# Patient Record
Sex: FEMALE | Race: WHITE | HISPANIC OR LATINO
[De-identification: names, ages, dates, MRNs, and addresses within clinical notes are randomized per-mention and may not be internally consistent; named-entity substitution may affect disease eponyms.]

---

## 2017-11-21 ENCOUNTER — FORM ENCOUNTER (OUTPATIENT)
Age: 62
End: 2017-11-21

## 2017-11-21 ENCOUNTER — APPOINTMENT (OUTPATIENT)
Dept: OBGYN | Facility: CLINIC | Age: 62
End: 2017-11-21
Payer: COMMERCIAL

## 2017-11-21 ENCOUNTER — APPOINTMENT (OUTPATIENT)
Dept: INTERNAL MEDICINE | Facility: CLINIC | Age: 62
End: 2017-11-21
Payer: COMMERCIAL

## 2017-11-21 ENCOUNTER — NON-APPOINTMENT (OUTPATIENT)
Age: 62
End: 2017-11-21

## 2017-11-21 VITALS
BODY MASS INDEX: 19.69 KG/M2 | DIASTOLIC BLOOD PRESSURE: 77 MMHG | SYSTOLIC BLOOD PRESSURE: 113 MMHG | HEIGHT: 62 IN | HEART RATE: 66 BPM | WEIGHT: 107 LBS

## 2017-11-21 DIAGNOSIS — Z81.8 FAMILY HISTORY OF OTHER MENTAL AND BEHAVIORAL DISORDERS: ICD-10-CM

## 2017-11-21 PROCEDURE — 99396 PREV VISIT EST AGE 40-64: CPT | Mod: 25

## 2017-11-21 PROCEDURE — 99396 PREV VISIT EST AGE 40-64: CPT

## 2017-11-21 PROCEDURE — G0008: CPT

## 2017-11-21 PROCEDURE — 90688 IIV4 VACCINE SPLT 0.5 ML IM: CPT

## 2017-11-21 PROCEDURE — 93000 ELECTROCARDIOGRAM COMPLETE: CPT

## 2017-11-22 ENCOUNTER — APPOINTMENT (OUTPATIENT)
Dept: MAMMOGRAPHY | Facility: IMAGING CENTER | Age: 62
End: 2017-11-22
Payer: COMMERCIAL

## 2017-11-22 ENCOUNTER — OUTPATIENT (OUTPATIENT)
Dept: OUTPATIENT SERVICES | Facility: HOSPITAL | Age: 62
LOS: 1 days | End: 2017-11-22
Payer: COMMERCIAL

## 2017-11-22 DIAGNOSIS — Z12.31 ENCOUNTER FOR SCREENING MAMMOGRAM FOR MALIGNANT NEOPLASM OF BREAST: ICD-10-CM

## 2017-11-22 PROCEDURE — 77063 BREAST TOMOSYNTHESIS BI: CPT

## 2017-11-22 PROCEDURE — 77063 BREAST TOMOSYNTHESIS BI: CPT | Mod: 26

## 2017-11-22 PROCEDURE — G0202: CPT | Mod: 26

## 2017-11-22 PROCEDURE — 77067 SCR MAMMO BI INCL CAD: CPT

## 2017-11-24 LAB — HPV HIGH+LOW RISK DNA PNL CVX: NOT DETECTED

## 2017-11-26 LAB — CYTOLOGY CVX/VAG DOC THIN PREP: NORMAL

## 2017-11-29 LAB
25(OH)D3 SERPL-MCNC: 25.4 NG/ML
BASOPHILS # BLD AUTO: 0.02 K/UL
BASOPHILS NFR BLD AUTO: 0.4 %
CHOLEST SERPL-MCNC: 267 MG/DL
CHOLEST/HDLC SERPL: 2.8 RATIO
EOSINOPHIL # BLD AUTO: 0.04 K/UL
EOSINOPHIL NFR BLD AUTO: 0.9 %
HBA1C MFR BLD HPLC: 5.6 %
HCT VFR BLD CALC: 37.6 %
HDLC SERPL-MCNC: 95 MG/DL
HGB BLD-MCNC: 12.1 G/DL
IMM GRANULOCYTES NFR BLD AUTO: 0.2 %
LDLC SERPL CALC-MCNC: 159 MG/DL
LYMPHOCYTES # BLD AUTO: 1.64 K/UL
LYMPHOCYTES NFR BLD AUTO: 36.3 %
MAN DIFF?: NORMAL
MCHC RBC-ENTMCNC: 28.4 PG
MCHC RBC-ENTMCNC: 32.2 GM/DL
MCV RBC AUTO: 88.3 FL
MONOCYTES # BLD AUTO: 0.26 K/UL
MONOCYTES NFR BLD AUTO: 5.8 %
NEUTROPHILS # BLD AUTO: 2.55 K/UL
NEUTROPHILS NFR BLD AUTO: 56.4 %
PLATELET # BLD AUTO: 235 K/UL
RBC # BLD: 4.26 M/UL
RBC # FLD: 14.5 %
TRIGL SERPL-MCNC: 65 MG/DL
TSH SERPL-ACNC: 2.56 UIU/ML
WBC # FLD AUTO: 4.52 K/UL

## 2017-12-27 ENCOUNTER — OUTPATIENT (OUTPATIENT)
Dept: OUTPATIENT SERVICES | Facility: HOSPITAL | Age: 62
LOS: 1 days | End: 2017-12-27
Payer: COMMERCIAL

## 2017-12-27 ENCOUNTER — APPOINTMENT (OUTPATIENT)
Dept: RADIOLOGY | Facility: IMAGING CENTER | Age: 62
End: 2017-12-27
Payer: COMMERCIAL

## 2017-12-27 DIAGNOSIS — Z13.820 ENCOUNTER FOR SCREENING FOR OSTEOPOROSIS: ICD-10-CM

## 2017-12-27 PROCEDURE — 77080 DXA BONE DENSITY AXIAL: CPT | Mod: 26

## 2017-12-27 PROCEDURE — 77080 DXA BONE DENSITY AXIAL: CPT

## 2018-03-16 LAB
ALBUMIN SERPL ELPH-MCNC: 4.4 G/DL
ALP BLD-CCNC: 93 U/L
ALT SERPL-CCNC: 15 U/L
ANION GAP SERPL CALC-SCNC: 13 MMOL/L
AST SERPL-CCNC: 26 U/L
BILIRUB SERPL-MCNC: 0.3 MG/DL
BUN SERPL-MCNC: 14 MG/DL
CALCIUM SERPL-MCNC: 9.6 MG/DL
CHLORIDE SERPL-SCNC: 103 MMOL/L
CO2 SERPL-SCNC: 26 MMOL/L
CREAT SERPL-MCNC: 0.53 MG/DL
GLUCOSE SERPL-MCNC: 80 MG/DL
POTASSIUM SERPL-SCNC: 3.8 MMOL/L
PROT SERPL-MCNC: 7.7 G/DL
SODIUM SERPL-SCNC: 142 MMOL/L

## 2018-10-30 ENCOUNTER — OTHER (OUTPATIENT)
Age: 63
End: 2018-10-30

## 2018-11-22 ENCOUNTER — FORM ENCOUNTER (OUTPATIENT)
Age: 63
End: 2018-11-22

## 2018-11-23 ENCOUNTER — APPOINTMENT (OUTPATIENT)
Dept: INTERNAL MEDICINE | Facility: CLINIC | Age: 63
End: 2018-11-23
Payer: COMMERCIAL

## 2018-11-23 ENCOUNTER — OUTPATIENT (OUTPATIENT)
Dept: OUTPATIENT SERVICES | Facility: HOSPITAL | Age: 63
LOS: 1 days | End: 2018-11-23
Payer: COMMERCIAL

## 2018-11-23 ENCOUNTER — APPOINTMENT (OUTPATIENT)
Dept: MAMMOGRAPHY | Facility: IMAGING CENTER | Age: 63
End: 2018-11-23
Payer: COMMERCIAL

## 2018-11-23 VITALS
WEIGHT: 110 LBS | DIASTOLIC BLOOD PRESSURE: 70 MMHG | HEART RATE: 60 BPM | BODY MASS INDEX: 20.12 KG/M2 | OXYGEN SATURATION: 97 % | SYSTOLIC BLOOD PRESSURE: 110 MMHG

## 2018-11-23 DIAGNOSIS — Z00.8 ENCOUNTER FOR OTHER GENERAL EXAMINATION: ICD-10-CM

## 2018-11-23 LAB
25(OH)D3 SERPL-MCNC: 33 NG/ML
ALBUMIN SERPL ELPH-MCNC: 3.9 G/DL
ALP BLD-CCNC: 102 U/L
ALT SERPL-CCNC: 12 U/L
ANION GAP SERPL CALC-SCNC: 10 MMOL/L
AST SERPL-CCNC: 26 U/L
BASOPHILS # BLD AUTO: 0.01 K/UL
BASOPHILS NFR BLD AUTO: 0.2 %
BILIRUB SERPL-MCNC: 0.4 MG/DL
BUN SERPL-MCNC: 12 MG/DL
CALCIUM SERPL-MCNC: 9.4 MG/DL
CHLORIDE SERPL-SCNC: 103 MMOL/L
CHOLEST SERPL-MCNC: 243 MG/DL
CHOLEST/HDLC SERPL: 2.8 RATIO
CO2 SERPL-SCNC: 25 MMOL/L
CREAT SERPL-MCNC: 0.7 MG/DL
EOSINOPHIL # BLD AUTO: 0.06 K/UL
EOSINOPHIL NFR BLD AUTO: 1.4 %
GLUCOSE SERPL-MCNC: 94 MG/DL
HBA1C MFR BLD HPLC: 5.6 %
HCT VFR BLD CALC: 37.3 %
HDLC SERPL-MCNC: 88 MG/DL
HGB BLD-MCNC: 11.9 G/DL
IMM GRANULOCYTES NFR BLD AUTO: 0.2 %
LDLC SERPL CALC-MCNC: 146 MG/DL
LYMPHOCYTES # BLD AUTO: 1.69 K/UL
LYMPHOCYTES NFR BLD AUTO: 38.6 %
MAN DIFF?: NORMAL
MCHC RBC-ENTMCNC: 27.9 PG
MCHC RBC-ENTMCNC: 31.9 GM/DL
MCV RBC AUTO: 87.6 FL
MONOCYTES # BLD AUTO: 0.4 K/UL
MONOCYTES NFR BLD AUTO: 9.1 %
NEUTROPHILS # BLD AUTO: 2.21 K/UL
NEUTROPHILS NFR BLD AUTO: 50.5 %
PLATELET # BLD AUTO: 232 K/UL
POTASSIUM SERPL-SCNC: 4.5 MMOL/L
PROT SERPL-MCNC: 7.3 G/DL
RBC # BLD: 4.26 M/UL
RBC # FLD: 14.3 %
SODIUM SERPL-SCNC: 138 MMOL/L
TRIGL SERPL-MCNC: 47 MG/DL
WBC # FLD AUTO: 4.38 K/UL

## 2018-11-23 PROCEDURE — 77067 SCR MAMMO BI INCL CAD: CPT

## 2018-11-23 PROCEDURE — 77063 BREAST TOMOSYNTHESIS BI: CPT | Mod: 26

## 2018-11-23 PROCEDURE — 77063 BREAST TOMOSYNTHESIS BI: CPT

## 2018-11-23 PROCEDURE — 99396 PREV VISIT EST AGE 40-64: CPT

## 2018-11-23 PROCEDURE — 77067 SCR MAMMO BI INCL CAD: CPT | Mod: 26

## 2018-11-30 NOTE — HEALTH RISK ASSESSMENT
[Very Good] : ~his/her~  mood as very good [] : No [No falls in past year] : Patient reported no falls in the past year [0] : 2) Feeling down, depressed, or hopeless: Not at all (0) [de-identified] : gyn [[id number]] : 0 [Change in mental status noted] : No change in mental status noted [Language] : denies difficulty with language [Behavior] : denies difficulty with behavior [Learning/Retaining New Information] : denies difficulty learning/retaining new information [Handling Complex Tasks] : denies difficulty handling complex tasks [Reasoning] : denies difficulty with reasoning [Spatial Ability and Orientation] : denies difficulty with spatial ability and orientation [None] : None [With Family] : lives with family [] :  [Feels Safe at Home] : Feels safe at home [Fully functional (bathing, dressing, toileting, transferring, walking, feeding)] : Fully functional (bathing, dressing, toileting, transferring, walking, feeding) [Fully functional (using the telephone, shopping, preparing meals, housekeeping, doing laundry, using] : Fully functional and needs no help or supervision to perform IADLs (using the telephone, shopping, preparing meals, housekeeping, doing laundry, using transportation, managing medications and managing finances) [Reports changes in hearing] : Reports no changes in hearing [Reports changes in vision] : Reports no changes in vision [Reports changes in dental health] : Reports no changes in dental health

## 2018-11-30 NOTE — HISTORY OF PRESENT ILLNESS
[FreeTextEntry1] : annual [de-identified] : Pt continues to feel well, continues excellent approach to diet and exercise. using turmeric and dha as well as anti-inflammatories.  emphasizing salad, fish, chicken in diet.  has some intermittent nighttime leg cramps.  otherwise has been doing well - no recent intercurrent illness.  following with gyn regularly.  works through any aches and pains.

## 2018-11-30 NOTE — PLAN
[FreeTextEntry1] : Pt feeling well and observing excellent diet and exercise routine.  to continue excellent approach, follow with gyn, keep on schedule with bone density and colon screening. has been unenthusiastic about colonoscopy but without elevated risk, and willing to check fit yearly.  new fit today.  will try mg for leg cramps.  vitamin d supplementation may be useful.

## 2019-04-23 ENCOUNTER — APPOINTMENT (OUTPATIENT)
Dept: OBGYN | Facility: CLINIC | Age: 64
End: 2019-04-23
Payer: COMMERCIAL

## 2019-04-23 VITALS
DIASTOLIC BLOOD PRESSURE: 68 MMHG | BODY MASS INDEX: 20.24 KG/M2 | HEART RATE: 66 BPM | SYSTOLIC BLOOD PRESSURE: 108 MMHG | WEIGHT: 110 LBS | HEIGHT: 62 IN

## 2019-04-23 DIAGNOSIS — Z13.820 ENCOUNTER FOR SCREENING FOR OSTEOPOROSIS: ICD-10-CM

## 2019-04-23 DIAGNOSIS — Z01.419 ENCOUNTER FOR GYNECOLOGICAL EXAMINATION (GENERAL) (ROUTINE) W/OUT ABNORMAL FINDINGS: ICD-10-CM

## 2019-04-23 DIAGNOSIS — Z12.31 ENCOUNTER FOR SCREENING MAMMOGRAM FOR MALIGNANT NEOPLASM OF BREAST: ICD-10-CM

## 2019-04-23 DIAGNOSIS — Z86.39 PERSONAL HISTORY OF OTHER ENDOCRINE, NUTRITIONAL AND METABOLIC DISEASE: ICD-10-CM

## 2019-04-23 PROCEDURE — 99396 PREV VISIT EST AGE 40-64: CPT

## 2019-04-23 NOTE — COUNSELING
[Breast Self Exam] : breast self exam [Exercise] : exercise [Vitamins/Supplements] : vitamins/supplements [Nutrition] : nutrition [STD (testing, results, tx)] : STD (testing, results, tx)

## 2019-04-23 NOTE — PHYSICAL EXAM
[Awake] : awake [Alert] : alert [Acute Distress] : no acute distress [LAD] : no lymphadenopathy [Thyroid Nodule] : no thyroid nodule [Goiter] : no goiter [Mass] : no breast mass [Nipple Discharge] : no nipple discharge [Axillary LAD] : no axillary lymphadenopathy [Distended] : not distended [Tender] : non tender [Soft] : soft [Depressed Mood] : not depressed [Oriented x3] : oriented to person, place, and time [Vulvar Atrophy] : vulvar atrophy [No Bleeding] : there was no active vaginal bleeding [Atrophy] : atrophy [Normal] : uterus [Pap Obtained] : a Pap smear was performed [Anteversion] : anteverted [No Tenderness] : no rectal tenderness [Uterine Adnexae] : were not tender and not enlarged [Tenderness] : nontender [FreeTextEntry9] : no masses, no nodules  [Nl Sphincter Tone] : normal sphincter tone

## 2019-05-02 LAB
CYTOLOGY CVX/VAG DOC THIN PREP: NORMAL
HPV HIGH+LOW RISK DNA PNL CVX: NOT DETECTED

## 2019-07-03 ENCOUNTER — APPOINTMENT (OUTPATIENT)
Dept: ENDOCRINOLOGY | Facility: CLINIC | Age: 64
End: 2019-07-03
Payer: COMMERCIAL

## 2019-07-03 VITALS — BODY MASS INDEX: 19.62 KG/M2 | HEIGHT: 62.1 IN | WEIGHT: 108 LBS

## 2019-07-03 VITALS — HEART RATE: 74 BPM | DIASTOLIC BLOOD PRESSURE: 64 MMHG | SYSTOLIC BLOOD PRESSURE: 110 MMHG | OXYGEN SATURATION: 97 %

## 2019-07-03 LAB — HEMOCCULT STL QL IA: NEGATIVE

## 2019-07-03 PROCEDURE — ZZZZZ: CPT

## 2019-07-03 PROCEDURE — 77080 DXA BONE DENSITY AXIAL: CPT

## 2019-07-03 PROCEDURE — 99205 OFFICE O/P NEW HI 60 MIN: CPT

## 2019-07-03 RX ORDER — COLD-HOT PACK
125 MCG EACH MISCELLANEOUS
Refills: 0 | Status: ACTIVE | COMMUNITY

## 2019-07-03 RX ORDER — MULTIVIT-MIN/FOLIC/VIT K/LYCOP 400-300MCG
1000 TABLET ORAL
Refills: 0 | Status: ACTIVE | COMMUNITY

## 2019-07-03 NOTE — CONSULT LETTER
[Please see my note below.] : Please see my note below. [Dear  ___] : Dear  [unfilled], [Consult Letter:] : I had the pleasure of evaluating your patient, [unfilled]. [Consult Closing:] : Thank you very much for allowing me to participate in the care of this patient.  If you have any questions, please do not hesitate to contact me. [FreeTextEntry2] : Nehemiah Spencer MD

## 2019-07-03 NOTE — ASSESSMENT
[Bisphosphonate Therapy] : Risks  and benefits of bisphosphonate therapy were  discussed with the patient including gastroesophageal irritation, osteonecrosis of the jaw, and atypical femur fractures, and acute phase reaction [FreeTextEntry1] : 65 y/o female with progressive, severe  postmenopausal osteoporosis.  Pt advised of high risk for future fracture. No obvious secondary cause but would r/o hyperpara given low radius.\par \par States that she might have been previously treated with Fosamax ~10 years ago but stopped taking due to side effects. No hx of osteoporosis related fx. No FHx of osteoporosis. Last DDS 1 day ago. BMD 7/2019 shows significant degree of osteoporosis. Spine stable vs 2017, total hip and fem neck decreased vs 2017. Lowest site is proximal radius (-4.6), no results prior. Takes Vit D supplements. \par \par Recommended initiating medical therapy to stop bone loss, increase BMD, and thus reduce risk of future fx. Options including anabolic therapy reviewed. Standard bone loss prevention medications were reviewed (bisphosphonates). She would benefit from bisphosphonate therapy with the expectation of a drug holiday within 5 years. Risks and benefits of bisphosphonate therapy were discussed to include decreased serum Ca, GI irritation, ONJ, atypical femur fx, and APR. Pt can consider IV Reclast or Prolia if she experiences UGI sx.\par \par All questions were answered. Pt understands and agrees to begin therapy with Actonel.         \par \par Request labs sent out to assess bone cell activity. \par \par F/u in 4 months, repeat BMD in 1 year.  [Bisphosphonates] : The patient was instructed to take bisphosphonates on an empty stomach with a full glass of water,and wait at least 30 minutes before eating or lying down

## 2019-07-03 NOTE — HISTORY OF PRESENT ILLNESS
[Alendronate (Fosomax)] : Alendronate [Regular Dental Follow-Up] : regular dental follow-up [FreeTextEntry1] : Ms. BECERRIL is a 64 year old female being referred today for osteoporosis.\par \par States that she might have been previously treated with Fosamax ~10 years ago but stopped taking due to side effects. No hx of osteoporosis related fx. No FHx of osteoporosis. Last DDS 1 day ago. Up to date with mammography. Takes Vit D supplements. \par \par H/o of foot surgery.  [Low Calcium Intake] : no past or present history of low calcium intake [Taking Steroids] : no past or present history of taking steroids [Kidney Stones] : no history of kidney stones [Current Smoking___(ppd)] : not currently smoking [Uses a Walker/Cane] : no use of a walker/cane [Family History of Osteoporosis] : no family history of osteoporosis [Family History of Breast Cancer] : no family history of breast cancer [Family History of Hip Fracture] : no family history of hip fracture [Hyperparathyroidism] : no hyperparathyroidism [History of Radiation Therapy] : no history of radiation therapy [History of Blood Clots] : no history of blood clots

## 2019-07-03 NOTE — PHYSICAL EXAM
[Alert] : alert [Well Developed] : well developed [No Acute Distress] : no acute distress [Well Nourished] : well nourished [Normal Sclera/Conjunctiva] : normal sclera/conjunctiva [EOMI] : extra ocular movement intact [Normal Oropharynx] : the oropharynx was normal [Thyroid Not Enlarged] : the thyroid was not enlarged [No Proptosis] : no proptosis [No Respiratory Distress] : no respiratory distress [No Thyroid Nodules] : there were no palpable thyroid nodules [Normal Rate] : heart rate was normal  [No Accessory Muscle Use] : no accessory muscle use [Clear to Auscultation] : lungs were clear to auscultation bilaterally [Normal S1, S2] : normal S1 and S2 [Regular Rhythm] : with a regular rhythm [Pedal Pulses Normal] : the pedal pulses are present [Not Tender] : non-tender [Normal Bowel Sounds] : normal bowel sounds [No Edema] : there was no peripheral edema [Soft] : abdomen soft [Not Distended] : not distended [Axillary Nodes] : axillary nodes [Normal] : normal and non tender [Anterior Cervical Nodes] : anterior cervical nodes [Post Cervical Nodes] : posterior cervical nodes [No Spinal Tenderness] : no spinal tenderness [Spine Straight] : spine straight [No Stigmata of Cushings Syndrome] : no stigmata of cushings syndrome [Normal Gait] : normal gait [Normal Strength/Tone] : muscle strength and tone were normal [No Rash] : no rash [Normal Reflexes] : deep tendon reflexes were 2+ and symmetric [No Tremors] : no tremors [Oriented x3] : oriented to person, place, and time [Acanthosis Nigricans] : no acanthosis nigricans

## 2019-07-03 NOTE — PROCEDURE
[FreeTextEntry1] : Bone mineral density: 07/03/2019 \par Indication: vs 2017 prior testshowed rapid bone loss \par Spine: -3.4, osteoporosis, no significant change \par Total hip: -3.1, osteoporosis, decrease vs 2017\par Femoral neck: -3.3, osteoporosis, decrease vs 2017\par Proximal radius: -4.6, osteoporosis, no prior\par \par Bone mineral density: 2017 at an outside study vs 2011\par Spine: -3.4, osteoporosis -15.9% \par Total hip: -2.8, osteoporosis -18.4%\par Femoral neck: -2.8, osteoporosis  -18.0%\par Proximal radius: not performed

## 2019-07-03 NOTE — END OF VISIT
[FreeTextEntry3] : I, Lexus Green, authored this note working as a medical scribe for Dr. Weinberg.  07/03/2019.  4:00PM. \par This note was authored by the medical scribe for me. I have reviewed, edited, and revised the note as needed. I am in agreement with the exam findings, imaging findings, and treatment plan.  Lee Weinberg MD

## 2019-07-05 ENCOUNTER — TRANSCRIPTION ENCOUNTER (OUTPATIENT)
Age: 64
End: 2019-07-05

## 2019-07-05 ENCOUNTER — APPOINTMENT (OUTPATIENT)
Dept: INTERNAL MEDICINE | Facility: CLINIC | Age: 64
End: 2019-07-05
Payer: COMMERCIAL

## 2019-07-05 VITALS — SYSTOLIC BLOOD PRESSURE: 100 MMHG | DIASTOLIC BLOOD PRESSURE: 60 MMHG | HEIGHT: 62 IN

## 2019-07-05 DIAGNOSIS — K64.9 UNSPECIFIED HEMORRHOIDS: ICD-10-CM

## 2019-07-05 LAB
25(OH)D3 SERPL-MCNC: 26.3 NG/ML
ALBUMIN SERPL ELPH-MCNC: 4.4 G/DL
ALP BLD-CCNC: 102 U/L
ALT SERPL-CCNC: 16 U/L
ANION GAP SERPL CALC-SCNC: 11 MMOL/L
AST SERPL-CCNC: 25 U/L
BILIRUB SERPL-MCNC: 0.3 MG/DL
BUN SERPL-MCNC: 16 MG/DL
CALCIUM SERPL-MCNC: 9.5 MG/DL
CALCIUM SERPL-MCNC: 9.5 MG/DL
CHLORIDE SERPL-SCNC: 105 MMOL/L
CO2 SERPL-SCNC: 25 MMOL/L
CREAT SERPL-MCNC: 0.81 MG/DL
GLUCOSE SERPL-MCNC: 89 MG/DL
PARATHYROID HORMONE INTACT: 53 PG/ML
PHOSPHATE SERPL-MCNC: 3.5 MG/DL
POTASSIUM SERPL-SCNC: 4.1 MMOL/L
PROT SERPL-MCNC: 7.3 G/DL
SODIUM SERPL-SCNC: 141 MMOL/L

## 2019-07-05 PROCEDURE — 99214 OFFICE O/P EST MOD 30 MIN: CPT

## 2019-07-08 LAB
TTG IGA SER IA-ACNC: <1.2 U/ML
TTG IGA SER-ACNC: NEGATIVE
TTG IGG SER IA-ACNC: 5.2 U/ML
TTG IGG SER IA-ACNC: NEGATIVE

## 2019-07-15 LAB — COLLAGEN CTX SERPL-MCNC: 428 PG/ML

## 2019-07-23 PROBLEM — K64.9 HEMORRHOID: Status: ACTIVE | Noted: 2019-07-23

## 2019-07-23 NOTE — PLAN
[FreeTextEntry1] : Pt now undergoing tx for osteoporosis [facility] endocrinology - pursuing bisphosphonate first.  she is willing to see colorectal about hemorrhoids as needed though trying some topical tx's first.  she will be willing to undertake cologuard as screening though not colonoscopy.

## 2019-07-23 NOTE — HISTORY OF PRESENT ILLNESS
[FreeTextEntry1] : follow up [de-identified] : Pt has discussed osteoporosis tx with Dr. Weinberg and plans to go forward with bisphosphonate.  degree of OP notable considering excellent lifelong approach to exercise as well as diet, but familial component notable. she is unwilling to pursue infusion tx's at this time.  she is working on keeping up exercise as always, keeping calcium/vitamin d intake reasonable.  continuing other usual follow-ups.  she is  interested in cologuard for screening for colon ca, as she has been unwilling to go for colonoscopy and it appears her insurance may cover this approach. has had hemorrhoids intermittently.

## 2019-07-23 NOTE — PHYSICAL EXAM
[No Acute Distress] : no acute distress [Well Nourished] : well nourished [Well Developed] : well developed [Well-Appearing] : well-appearing [Normal Sclera/Conjunctiva] : normal sclera/conjunctiva [PERRL] : pupils equal round and reactive to light [EOMI] : extraocular movements intact [Normal Outer Ear/Nose] : the outer ears and nose were normal in appearance [Normal Oropharynx] : the oropharynx was normal [No JVD] : no jugular venous distention [No Lymphadenopathy] : no lymphadenopathy [Supple] : supple [Thyroid Normal, No Nodules] : the thyroid was normal and there were no nodules present [No Respiratory Distress] : no respiratory distress  [No Accessory Muscle Use] : no accessory muscle use [Regular Rhythm] : with a regular rhythm [Normal Rate] : normal rate  [Normal S1, S2] : normal S1 and S2 [Clear to Auscultation] : lungs were clear to auscultation bilaterally [No Abdominal Bruit] : a ~M bruit was not heard ~T in the abdomen [No Carotid Bruits] : no carotid bruits [No Murmur] : no murmur heard [Pedal Pulses Present] : the pedal pulses are present [No Varicosities] : no varicosities [No Extremity Clubbing/Cyanosis] : no extremity clubbing/cyanosis [No Palpable Aorta] : no palpable aorta [No Edema] : there was no peripheral edema [Non Tender] : non-tender [Non-distended] : non-distended [Soft] : abdomen soft [No Masses] : no abdominal mass palpated [No HSM] : no HSM [Normal Bowel Sounds] : normal bowel sounds [No CVA Tenderness] : no CVA  tenderness [Normal Anterior Cervical Nodes] : no anterior cervical lymphadenopathy [Normal Posterior Cervical Nodes] : no posterior cervical lymphadenopathy [Grossly Normal Strength/Tone] : grossly normal strength/tone [No Joint Swelling] : no joint swelling [No Spinal Tenderness] : no spinal tenderness [No Focal Deficits] : no focal deficits [No Rash] : no rash [Coordination Grossly Intact] : coordination grossly intact [Normal Affect] : the affect was normal [Normal Insight/Judgement] : insight and judgment were intact [Deep Tendon Reflexes (DTR)] : deep tendon reflexes were 2+ and symmetric [Normal Gait] : normal gait

## 2019-09-11 ENCOUNTER — MEDICATION RENEWAL (OUTPATIENT)
Age: 64
End: 2019-09-11

## 2019-10-04 ENCOUNTER — APPOINTMENT (OUTPATIENT)
Dept: ENDOCRINOLOGY | Facility: CLINIC | Age: 64
End: 2019-10-04
Payer: COMMERCIAL

## 2019-10-04 PROCEDURE — 96401 CHEMO ANTI-NEOPL SQ/IM: CPT

## 2019-10-04 PROCEDURE — 99212 OFFICE O/P EST SF 10 MIN: CPT | Mod: 25

## 2019-10-04 RX ORDER — DENOSUMAB 60 MG/ML
60 INJECTION SUBCUTANEOUS
Qty: 1 | Refills: 0 | Status: COMPLETED | OUTPATIENT
Start: 2019-10-04

## 2019-10-04 RX ORDER — RISEDRONATE SODIUM 150 MG/1
150 TABLET, FILM COATED ORAL
Qty: 3 | Refills: 3 | Status: DISCONTINUED | COMMUNITY
Start: 2019-07-03 | End: 2019-10-04

## 2019-10-04 RX ORDER — CHLORHEXIDINE GLUCONATE 4 %
1000 LIQUID (ML) TOPICAL DAILY
Refills: 0 | Status: ACTIVE | COMMUNITY
Start: 2019-10-04

## 2019-10-04 RX ADMIN — DENOSUMAB 0 MG/ML: 60 INJECTION SUBCUTANEOUS at 00:00

## 2019-11-27 ENCOUNTER — APPOINTMENT (OUTPATIENT)
Dept: COLORECTAL SURGERY | Facility: CLINIC | Age: 64
End: 2019-11-27

## 2019-12-12 ENCOUNTER — APPOINTMENT (OUTPATIENT)
Dept: ENDOCRINOLOGY | Facility: CLINIC | Age: 64
End: 2019-12-12

## 2019-12-23 ENCOUNTER — OUTPATIENT (OUTPATIENT)
Dept: OUTPATIENT SERVICES | Facility: HOSPITAL | Age: 64
LOS: 1 days | End: 2019-12-23
Payer: COMMERCIAL

## 2019-12-23 ENCOUNTER — APPOINTMENT (OUTPATIENT)
Dept: MAMMOGRAPHY | Facility: IMAGING CENTER | Age: 64
End: 2019-12-23
Payer: COMMERCIAL

## 2019-12-23 DIAGNOSIS — Z12.39 ENCOUNTER FOR OTHER SCREENING FOR MALIGNANT NEOPLASM OF BREAST: ICD-10-CM

## 2019-12-23 PROCEDURE — 77063 BREAST TOMOSYNTHESIS BI: CPT

## 2019-12-23 PROCEDURE — 77067 SCR MAMMO BI INCL CAD: CPT

## 2019-12-23 PROCEDURE — 77067 SCR MAMMO BI INCL CAD: CPT | Mod: 26

## 2019-12-23 PROCEDURE — 77063 BREAST TOMOSYNTHESIS BI: CPT | Mod: 26

## 2019-12-24 ENCOUNTER — APPOINTMENT (OUTPATIENT)
Dept: ENDOCRINOLOGY | Facility: CLINIC | Age: 64
End: 2019-12-24

## 2020-01-14 ENCOUNTER — APPOINTMENT (OUTPATIENT)
Dept: ENDOCRINOLOGY | Facility: CLINIC | Age: 65
End: 2020-01-14

## 2020-07-01 ENCOUNTER — APPOINTMENT (OUTPATIENT)
Dept: ENDOCRINOLOGY | Facility: CLINIC | Age: 65
End: 2020-07-01

## 2020-07-07 ENCOUNTER — APPOINTMENT (OUTPATIENT)
Dept: OBGYN | Facility: CLINIC | Age: 65
End: 2020-07-07
Payer: COMMERCIAL

## 2020-07-07 VITALS
DIASTOLIC BLOOD PRESSURE: 70 MMHG | BODY MASS INDEX: 21.35 KG/M2 | WEIGHT: 116 LBS | HEIGHT: 62 IN | SYSTOLIC BLOOD PRESSURE: 110 MMHG | TEMPERATURE: 97.9 F | HEART RATE: 70 BPM

## 2020-07-07 DIAGNOSIS — K62.5 HEMORRHAGE OF ANUS AND RECTUM: ICD-10-CM

## 2020-07-07 DIAGNOSIS — Z12.11 ENCOUNTER FOR SCREENING FOR MALIGNANT NEOPLASM OF COLON: ICD-10-CM

## 2020-07-07 PROCEDURE — 99397 PER PM REEVAL EST PAT 65+ YR: CPT

## 2020-07-07 NOTE — HISTORY OF PRESENT ILLNESS
[Good] : being in good health [Last Colonoscopy ___] : Last colonoscopy [unfilled] [Postmenopausal] : is postmenopausal [HPV Vaccine NA Due to Age] : HPV vaccine not available to patient due to age [Last Bone Density ___] : Last bone density studies [unfilled] [Last Mammogram ___] : Last Mammogram was [unfilled] [Last Pap ___] : Last cervical pap smear was [unfilled]

## 2020-07-07 NOTE — PHYSICAL EXAM
[Awake] : awake [Alert] : alert [Soft] : soft [Oriented x3] : oriented to person, place, and time [Normal] : uterus [No Bleeding] : there was no active vaginal bleeding [Uterine Adnexae] : were not tender and not enlarged [Acute Distress] : no acute distress [Mass] : no breast mass [Axillary LAD] : no axillary lymphadenopathy [Nipple Discharge] : no nipple discharge [Tender] : non tender [Atrophy] : atrophy

## 2020-07-07 NOTE — CHIEF COMPLAINT
[Annual Visit] : annual visit [FreeTextEntry1] : father withdementia\par Has 2  [facility]6 mos apart\par Works [employer] at home\frantz Colorguard for rectal bleeding--rec colonoscopy--last one 10 years ago

## 2020-07-08 ENCOUNTER — APPOINTMENT (OUTPATIENT)
Dept: ENDOCRINOLOGY | Facility: CLINIC | Age: 65
End: 2020-07-08
Payer: COMMERCIAL

## 2020-07-08 VITALS
TEMPERATURE: 98.2 F | DIASTOLIC BLOOD PRESSURE: 72 MMHG | OXYGEN SATURATION: 97 % | WEIGHT: 115 LBS | HEIGHT: 62 IN | HEART RATE: 71 BPM | SYSTOLIC BLOOD PRESSURE: 104 MMHG | BODY MASS INDEX: 21.16 KG/M2

## 2020-07-08 PROCEDURE — 99213 OFFICE O/P EST LOW 20 MIN: CPT | Mod: 25

## 2020-07-08 PROCEDURE — 96401 CHEMO ANTI-NEOPL SQ/IM: CPT

## 2020-07-08 RX ORDER — DENOSUMAB 60 MG/ML
60 INJECTION SUBCUTANEOUS
Qty: 1 | Refills: 0 | Status: COMPLETED | OUTPATIENT
Start: 2020-07-08

## 2020-07-08 RX ADMIN — DENOSUMAB 60 MG/ML: 60 INJECTION SUBCUTANEOUS at 00:00

## 2020-07-09 LAB
25(OH)D3 SERPL-MCNC: 29.3 NG/ML
ALBUMIN SERPL ELPH-MCNC: 4.5 G/DL
ALP BLD-CCNC: 72 U/L
ALT SERPL-CCNC: 17 U/L
ANION GAP SERPL CALC-SCNC: 12 MMOL/L
AST SERPL-CCNC: 25 U/L
BILIRUB SERPL-MCNC: 0.4 MG/DL
BUN SERPL-MCNC: 15 MG/DL
CALCIUM SERPL-MCNC: 9.6 MG/DL
CHLORIDE SERPL-SCNC: 102 MMOL/L
CO2 SERPL-SCNC: 24 MMOL/L
CREAT SERPL-MCNC: 0.78 MG/DL
GLUCOSE SERPL-MCNC: 95 MG/DL
POTASSIUM SERPL-SCNC: 4.4 MMOL/L
PROT SERPL-MCNC: 7.4 G/DL
SODIUM SERPL-SCNC: 139 MMOL/L

## 2020-07-09 NOTE — ASSESSMENT
[Denosumab Therapy] : Risks  and benefits of denosumab therapy were discussed with the patient including eczema, cellulitis, osteonecrosis of the jaw and atypical femur fractures [FreeTextEntry1] : 64 y/o female with progressive, severe  postmenopausal osteoporosis.  Pt advised of high risk for future fracture.  Tried Actonel did not tolerate. \par Began Prolia 10/2019. Tolerating Prolia well. No interval fx. No ONJ, AFF sx. Last DDS within 6 months. Calcium normal: \par [facility] from [facility]\par repeat BMD next visit \par Vitamin D insufficiency. Continue vitamin D 5,000 untis per day. .

## 2020-07-09 NOTE — HISTORY OF PRESENT ILLNESS
[Alendronate (Fosomax)] : Alendronate [Regular Dental Follow-Up] : regular dental follow-up [FreeTextEntry1] : Tried Actonel did not tolerate.  Began Prolia 10/2019 Tolerating Prolia well. No interval fx. No ONJ, AFF sx. Last DDS within 6 months. Calcium normal: \par \par States that she might have been previously treated with Fosamax ~10 years ago but stopped taking due to side effects. No hx of osteoporosis related fx. No FHx of osteoporosis. Last DDS 1 day ago. Up to date with mammography. Takes Vit D supplements. \par \par H/o of foot surgery.  [Taking Steroids] : no past or present history of taking steroids [Low Calcium Intake] : no past or present history of low calcium intake [Kidney Stones] : no history of kidney stones [Current Smoking___(ppd)] : not currently smoking [Uses a Walker/Cane] : no use of a walker/cane [Family History of Breast Cancer] : no family history of breast cancer [Family History of Osteoporosis] : no family history of osteoporosis [Family History of Hip Fracture] : no family history of hip fracture [Hyperparathyroidism] : no hyperparathyroidism [History of Radiation Therapy] : no history of radiation therapy [History of Blood Clots] : no history of blood clots

## 2020-07-09 NOTE — PHYSICAL EXAM
[Alert] : alert [Well Nourished] : well nourished [No Acute Distress] : no acute distress [Well Developed] : well developed [Normal Sclera/Conjunctiva] : normal sclera/conjunctiva [EOMI] : extra ocular movement intact [No Proptosis] : no proptosis [Normal Oropharynx] : the oropharynx was normal [Thyroid Not Enlarged] : the thyroid was not enlarged [No Thyroid Nodules] : no palpable thyroid nodules [No Respiratory Distress] : no respiratory distress [No Accessory Muscle Use] : no accessory muscle use [Clear to Auscultation] : lungs were clear to auscultation bilaterally [Normal S1, S2] : normal S1 and S2 [Normal Rate] : heart rate was normal [Regular Rhythm] : with a regular rhythm [No Edema] : no peripheral edema [Normal Bowel Sounds] : normal bowel sounds [Not Tender] : non-tender [Not Distended] : not distended [Soft] : abdomen soft [Normal Anterior Cervical Nodes] : no anterior cervical lymphadenopathy [Normal Posterior Cervical Nodes] : no posterior cervical lymphadenopathy [No Spinal Tenderness] : no spinal tenderness [Spine Straight] : spine straight [No Stigmata of Cushings Syndrome] : no stigmata of Cushings Syndrome [Normal Gait] : normal gait [Normal Strength/Tone] : muscle strength and tone were normal [No Rash] : no rash [Normal Reflexes] : deep tendon reflexes were 2+ and symmetric [No Tremors] : no tremors [Oriented x3] : oriented to person, place, and time [Acanthosis Nigricans] : no acanthosis nigricans

## 2021-01-08 ENCOUNTER — APPOINTMENT (OUTPATIENT)
Dept: ENDOCRINOLOGY | Facility: CLINIC | Age: 66
End: 2021-01-08
Payer: COMMERCIAL

## 2021-01-08 VITALS
DIASTOLIC BLOOD PRESSURE: 60 MMHG | WEIGHT: 116 LBS | TEMPERATURE: 97.2 F | OXYGEN SATURATION: 98 % | HEART RATE: 74 BPM | SYSTOLIC BLOOD PRESSURE: 110 MMHG | BODY MASS INDEX: 21.35 KG/M2 | HEIGHT: 62 IN

## 2021-01-08 PROCEDURE — 99072 ADDL SUPL MATRL&STAF TM PHE: CPT

## 2021-01-08 PROCEDURE — 99214 OFFICE O/P EST MOD 30 MIN: CPT | Mod: 25

## 2021-01-08 PROCEDURE — 96401 CHEMO ANTI-NEOPL SQ/IM: CPT

## 2021-01-08 RX ORDER — DENOSUMAB 60 MG/ML
60 INJECTION SUBCUTANEOUS
Qty: 1 | Refills: 0 | Status: COMPLETED | OUTPATIENT
Start: 2021-01-08

## 2021-01-08 RX ADMIN — DENOSUMAB 60 MG/ML: 60 INJECTION SUBCUTANEOUS at 00:00

## 2021-01-12 NOTE — ASSESSMENT
[Denosumab Therapy] : Risks  and benefits of denosumab therapy were discussed with the patient including eczema, cellulitis, osteonecrosis of the jaw and atypical femur fractures [FreeTextEntry1] : 64 y/o female with progressive, severe  postmenopausal osteoporosis.  Pt advised of high risk for future fracture.  Tried Actonel did not tolerate. \par Began Prolia 10/2019. Tolerating Prolia well. No interval fx. No ONJ, AFF sx. Last DDS within 6 months. Calcium normal: \par [facility]ia from [facility]\par due for repeat BMD , pt prefers to delay until next visit \par Vitamin D insufficiency. Continue vitamin D 5,000 untis per day. .

## 2021-01-12 NOTE — PROCEDURE
[FreeTextEntry1] : Bone mineral density: 07/03/2019 \par Indication: vs 2017 prior test showed rapid bone loss \par Spine: -3.4, osteoporosis, no significant change \par Total hip: -3.1, osteoporosis, decrease vs 2017\par Femoral neck: -3.3, osteoporosis, decrease vs 2017\par Proximal radius: -4.6, osteoporosis, no prior\par \par Bone mineral density: 2017 at an outside study vs 2011\par Spine: -3.4, osteoporosis -15.9% \par Total hip: -2.8, osteoporosis -18.4%\par Femoral neck: -2.8, osteoporosis  -18.0%\par Proximal radius: not performed

## 2021-01-12 NOTE — HISTORY OF PRESENT ILLNESS
[Alendronate (Fosomax)] : Alendronate [Regular Dental Follow-Up] : regular dental follow-up [FreeTextEntry1] : Tried Actonel did not tolerate.  Began Prolia 10/2019 Tolerating Prolia well. No interval fx. No ONJ, AFF sx. Last DDS within 6 months. Calcium normal: \par \par States that she might have been previously treated with Fosamax ~10 years ago but stopped taking due to side effects. No hx of osteoporosis related fx. No FHx of osteoporosis... Up to date with mammography. Takes Vit D supplements. \par \par H/o of foot surgery.  [Low Calcium Intake] : no past or present history of low calcium intake [Taking Steroids] : no past or present history of taking steroids [Kidney Stones] : no history of kidney stones [Current Smoking___(ppd)] : not currently smoking [Uses a Walker/Cane] : no use of a walker/cane [Family History of Osteoporosis] : no family history of osteoporosis [Family History of Breast Cancer] : no family history of breast cancer [Family History of Hip Fracture] : no family history of hip fracture [Hyperparathyroidism] : no hyperparathyroidism [History of Radiation Therapy] : no history of radiation therapy [History of Blood Clots] : no history of blood clots

## 2021-01-15 ENCOUNTER — APPOINTMENT (OUTPATIENT)
Dept: ENDOCRINOLOGY | Facility: CLINIC | Age: 66
End: 2021-01-15

## 2021-04-08 ENCOUNTER — APPOINTMENT (OUTPATIENT)
Dept: ULTRASOUND IMAGING | Facility: IMAGING CENTER | Age: 66
End: 2021-04-08
Payer: COMMERCIAL

## 2021-04-08 ENCOUNTER — RESULT REVIEW (OUTPATIENT)
Age: 66
End: 2021-04-08

## 2021-04-08 ENCOUNTER — APPOINTMENT (OUTPATIENT)
Dept: MAMMOGRAPHY | Facility: IMAGING CENTER | Age: 66
End: 2021-04-08
Payer: COMMERCIAL

## 2021-04-08 ENCOUNTER — OUTPATIENT (OUTPATIENT)
Dept: OUTPATIENT SERVICES | Facility: HOSPITAL | Age: 66
LOS: 1 days | End: 2021-04-08
Payer: COMMERCIAL

## 2021-04-08 DIAGNOSIS — R92.8 OTHER ABNORMAL AND INCONCLUSIVE FINDINGS ON DIAGNOSTIC IMAGING OF BREAST: ICD-10-CM

## 2021-04-08 DIAGNOSIS — Z00.8 ENCOUNTER FOR OTHER GENERAL EXAMINATION: ICD-10-CM

## 2021-04-08 PROCEDURE — 77063 BREAST TOMOSYNTHESIS BI: CPT | Mod: 26

## 2021-04-08 PROCEDURE — 77067 SCR MAMMO BI INCL CAD: CPT | Mod: 26

## 2021-04-08 PROCEDURE — 77067 SCR MAMMO BI INCL CAD: CPT

## 2021-04-08 PROCEDURE — 77063 BREAST TOMOSYNTHESIS BI: CPT

## 2021-04-17 ENCOUNTER — RESULT REVIEW (OUTPATIENT)
Age: 66
End: 2021-04-17

## 2021-04-17 ENCOUNTER — APPOINTMENT (OUTPATIENT)
Dept: ULTRASOUND IMAGING | Facility: IMAGING CENTER | Age: 66
End: 2021-04-17
Payer: COMMERCIAL

## 2021-04-17 ENCOUNTER — OUTPATIENT (OUTPATIENT)
Dept: OUTPATIENT SERVICES | Facility: HOSPITAL | Age: 66
LOS: 1 days | End: 2021-04-17
Payer: COMMERCIAL

## 2021-04-17 ENCOUNTER — APPOINTMENT (OUTPATIENT)
Dept: MAMMOGRAPHY | Facility: IMAGING CENTER | Age: 66
End: 2021-04-17
Payer: COMMERCIAL

## 2021-04-17 DIAGNOSIS — Z00.8 ENCOUNTER FOR OTHER GENERAL EXAMINATION: ICD-10-CM

## 2021-04-17 PROCEDURE — G0279: CPT | Mod: 26

## 2021-04-17 PROCEDURE — 77066 DX MAMMO INCL CAD BI: CPT | Mod: 26

## 2021-04-17 PROCEDURE — 77066 DX MAMMO INCL CAD BI: CPT

## 2021-04-17 PROCEDURE — 76641 ULTRASOUND BREAST COMPLETE: CPT

## 2021-04-17 PROCEDURE — 76641 ULTRASOUND BREAST COMPLETE: CPT | Mod: 26,RT

## 2021-04-17 PROCEDURE — G0279: CPT

## 2021-04-19 ENCOUNTER — NON-APPOINTMENT (OUTPATIENT)
Age: 66
End: 2021-04-19

## 2021-07-09 ENCOUNTER — APPOINTMENT (OUTPATIENT)
Dept: ENDOCRINOLOGY | Facility: CLINIC | Age: 66
End: 2021-07-09
Payer: COMMERCIAL

## 2021-07-09 VITALS
DIASTOLIC BLOOD PRESSURE: 76 MMHG | OXYGEN SATURATION: 97 % | BODY MASS INDEX: 20.98 KG/M2 | HEART RATE: 67 BPM | WEIGHT: 114 LBS | SYSTOLIC BLOOD PRESSURE: 118 MMHG | HEIGHT: 62 IN | TEMPERATURE: 98.3 F

## 2021-07-09 PROCEDURE — 77080 DXA BONE DENSITY AXIAL: CPT

## 2021-07-09 PROCEDURE — ZZZZZ: CPT

## 2021-07-09 PROCEDURE — 96401 CHEMO ANTI-NEOPL SQ/IM: CPT

## 2021-07-09 PROCEDURE — 99072 ADDL SUPL MATRL&STAF TM PHE: CPT

## 2021-07-09 PROCEDURE — 99214 OFFICE O/P EST MOD 30 MIN: CPT | Mod: 25

## 2021-07-09 RX ORDER — DENOSUMAB 60 MG/ML
60 INJECTION SUBCUTANEOUS
Qty: 1 | Refills: 0 | Status: COMPLETED | OUTPATIENT
Start: 2021-07-09

## 2021-07-09 RX ADMIN — DENOSUMAB 60 MG/ML: 60 INJECTION SUBCUTANEOUS at 00:00

## 2021-07-09 NOTE — END OF VISIT
[FreeTextEntry3] : I, Randy Deluca, authored this note working as a medical scribe for Dr. Weinberg.  07/09/2021. 12:45PM. This note was authored by the medical scribe for me. I have reviewed, edited, and revised the note as needed. I am in agreement with the exam findings, imaging findings, and treatment plan.  Lee Weinberg MD

## 2021-07-09 NOTE — ASSESSMENT
[Denosumab Therapy] : Risks  and benefits of denosumab therapy were discussed with the patient including eczema, cellulitis, osteonecrosis of the jaw and atypical femur fractures [FreeTextEntry1] : 66 y/o female with postmenopausal osteoporosis.\par \par Pt advised of high risk for future fracture. Tried Actonel did not tolerate. Began Prolia 10/2019. Tolerating well. No thigh pain, no interval fx. BMD 7/2021 indicates improved osteoporosis in spine and hip, stable but severely low stable osteoporosis in proximal radius. BMD results reviewed w/ pt. Continue Prolia, from [facility].\par \par Vitamin D insufficiency. Continue vitamin D 5,000 units per day.\par \par Request labs sent out.\par \par F/u in 6 months

## 2021-07-09 NOTE — PROCEDURE
[FreeTextEntry1] : Bone mineral density: 07/09/2021 \par Indication: vs. 2019 assess response to medication\par Spine: -3.1 osteoporosis, +3.9%\par Total hip: -2.8 osteoporosis, +7.8%\par Femoral neck: -2.9 osteoporosis, +7.8%\par Proximal radius: -4.5 osteoporosis, no significant change\par \par Bone mineral density: 07/03/2019 \par Indication: vs 2017 prior test showed rapid bone loss \par Spine: -3.4, osteoporosis, no significant change \par Total hip: -3.1, osteoporosis, decrease vs 2017\par Femoral neck: -3.3, osteoporosis, decrease vs 2017\par Proximal radius: -4.6, osteoporosis, no prior\par \par Bone mineral density: 2017 at an outside study vs 2011\par Spine: -3.4, osteoporosis -15.9% \par Total hip: -2.8, osteoporosis -18.4%\par Femoral neck: -2.8, osteoporosis  -18.0%\par Proximal radius: not performed

## 2021-07-09 NOTE — HISTORY OF PRESENT ILLNESS
[Alendronate (Fosomax)] : Alendronate [Risedronate (Actonel)] : Risedronate [Denosumab (Prolia)] : Denosumab [FreeTextEntry1] : No significant interval health changes. No interval surgery, hospitalizations, fractures, or change in medications.\par \par Tried Actonel did not tolerate. Began Prolia 10/2019. Tolerating well. No thigh pain, no interval fx. Last DDS within past 6 months. No ONJ.  States that she might have been previously treated with Fosamax ~10 years ago but stopped taking due to side effects. No hx of osteoporosis related fx. No FHx of osteoporosis. Up to date with mammography. Takes Vit D supplements. \par \par H/o of foot surgery.

## 2021-07-10 LAB
25(OH)D3 SERPL-MCNC: 35.7 NG/ML
ALBUMIN SERPL ELPH-MCNC: 4.3 G/DL
ALP BLD-CCNC: 57 U/L
ALT SERPL-CCNC: 18 U/L
ANION GAP SERPL CALC-SCNC: 10 MMOL/L
AST SERPL-CCNC: 26 U/L
BILIRUB SERPL-MCNC: 0.3 MG/DL
BUN SERPL-MCNC: 13 MG/DL
CALCIUM SERPL-MCNC: 9.6 MG/DL
CALCIUM SERPL-MCNC: 9.6 MG/DL
CHLORIDE SERPL-SCNC: 105 MMOL/L
CO2 SERPL-SCNC: 26 MMOL/L
CREAT SERPL-MCNC: 0.68 MG/DL
GLUCOSE SERPL-MCNC: 72 MG/DL
PARATHYROID HORMONE INTACT: 51 PG/ML
PHOSPHATE SERPL-MCNC: 3.3 MG/DL
POTASSIUM SERPL-SCNC: 5 MMOL/L
PROT SERPL-MCNC: 7 G/DL
SODIUM SERPL-SCNC: 141 MMOL/L
TSH SERPL-ACNC: 2.37 UIU/ML

## 2021-07-13 ENCOUNTER — APPOINTMENT (OUTPATIENT)
Dept: INTERNAL MEDICINE | Facility: CLINIC | Age: 66
End: 2021-07-13

## 2021-09-30 ENCOUNTER — TRANSCRIPTION ENCOUNTER (OUTPATIENT)
Age: 66
End: 2021-09-30

## 2021-10-13 ENCOUNTER — TRANSCRIPTION ENCOUNTER (OUTPATIENT)
Age: 66
End: 2021-10-13

## 2021-11-24 ENCOUNTER — APPOINTMENT (OUTPATIENT)
Dept: INTERNAL MEDICINE | Facility: CLINIC | Age: 66
End: 2021-11-24
Payer: COMMERCIAL

## 2021-11-24 VITALS
HEART RATE: 66 BPM | WEIGHT: 115 LBS | HEIGHT: 62 IN | DIASTOLIC BLOOD PRESSURE: 74 MMHG | BODY MASS INDEX: 21.16 KG/M2 | OXYGEN SATURATION: 99 % | SYSTOLIC BLOOD PRESSURE: 112 MMHG

## 2021-11-24 DIAGNOSIS — Z00.00 ENCOUNTER FOR GENERAL ADULT MEDICAL EXAMINATION W/OUT ABNORMAL FINDINGS: ICD-10-CM

## 2021-11-24 PROCEDURE — 99397 PER PM REEVAL EST PAT 65+ YR: CPT

## 2021-12-02 ENCOUNTER — APPOINTMENT (OUTPATIENT)
Dept: OBGYN | Facility: CLINIC | Age: 66
End: 2021-12-02

## 2021-12-06 ENCOUNTER — NON-APPOINTMENT (OUTPATIENT)
Age: 66
End: 2021-12-06

## 2021-12-31 NOTE — PLAN
[FreeTextEntry1] : Pt has remained in excellent fitness with ongoing attention and adjustments to diet, exercise.  she has adopted certain helpful naturals - melatonin for sleep, probiotics for gi.  moderna booster will be next for covid prevention; pna vax. mg may be helpful for blepharospasm and even for regulating constipation tendency.  hydration.  checking blds.

## 2021-12-31 NOTE — HEALTH RISK ASSESSMENT
[Very Good] : ~his/her~  mood as very good [No] : In the past 12 months have you used drugs other than those required for medical reasons? No [No falls in past year] : Patient reported no falls in the past year [0] : 2) Feeling down, depressed, or hopeless: Not at all (0) [PHQ-2 Negative - No further assessment needed] : PHQ-2 Negative - No further assessment needed [de-identified] : as in hpi [de-identified] : as in hpi [de-identified] : as in hpi [With Family] : lives with family [Employed] : employed [Feels Safe at Home] : Feels safe at home [Fully functional (bathing, dressing, toileting, transferring, walking, feeding)] : Fully functional (bathing, dressing, toileting, transferring, walking, feeding) [Fully functional (using the telephone, shopping, preparing meals, housekeeping, doing laundry, using] : Fully functional and needs no help or supervision to perform IADLs (using the telephone, shopping, preparing meals, housekeeping, doing laundry, using transportation, managing medications and managing finances) [Reports changes in hearing] : Reports no changes in hearing [Reports changes in vision] : Reports no changes in vision [Reports normal functional visual acuity (ie: able to read med bottle)] : Reports normal functional visual acuity [Reports changes in dental health] : Reports no changes in dental health

## 2021-12-31 NOTE — HISTORY OF PRESENT ILLNESS
[FreeTextEntry1] : annual [de-identified] : Pt has remained in impressively good fitness with regular exercise and with diet featuring oatmeal, salads, nuts, salmon, seeds, water, matcha green tea, green juice. has adapted exercise to lighter weights over time, biking, yoga, also meditation.  keeping up kegels to fight tendency to mild stress incontinence.  melatonin for sleep.  last year had nagging stye l eye. taking probiotic to keep gi tract regulated. has tendency to blepharospasm and to constipation.  had j+j covid vax - thinking about which will be next booster.

## 2022-01-13 ENCOUNTER — NON-APPOINTMENT (OUTPATIENT)
Age: 67
End: 2022-01-13

## 2022-01-14 ENCOUNTER — APPOINTMENT (OUTPATIENT)
Dept: ENDOCRINOLOGY | Facility: CLINIC | Age: 67
End: 2022-01-14
Payer: COMMERCIAL

## 2022-01-14 PROCEDURE — 96401 CHEMO ANTI-NEOPL SQ/IM: CPT

## 2022-01-14 RX ORDER — DENOSUMAB 60 MG/ML
60 INJECTION SUBCUTANEOUS
Qty: 1 | Refills: 0 | Status: COMPLETED | OUTPATIENT
Start: 2022-01-14

## 2022-01-14 RX ADMIN — DENOSUMAB 60 MG/ML: 60 INJECTION SUBCUTANEOUS at 00:00

## 2022-02-25 ENCOUNTER — APPOINTMENT (OUTPATIENT)
Dept: GASTROENTEROLOGY | Facility: CLINIC | Age: 67
End: 2022-02-25

## 2022-03-28 LAB
25(OH)D3 SERPL-MCNC: 39.1 NG/ML
ALBUMIN SERPL ELPH-MCNC: 4.5 G/DL
ALP BLD-CCNC: 53 U/L
ALT SERPL-CCNC: 20 U/L
ANION GAP SERPL CALC-SCNC: 12 MMOL/L
AST SERPL-CCNC: 27 U/L
BASOPHILS # BLD AUTO: 0.02 K/UL
BASOPHILS NFR BLD AUTO: 0.5 %
BILIRUB SERPL-MCNC: 0.3 MG/DL
BUN SERPL-MCNC: 18 MG/DL
CALCIUM SERPL-MCNC: 9.8 MG/DL
CHLORIDE SERPL-SCNC: 103 MMOL/L
CHOLEST SERPL-MCNC: 306 MG/DL
CO2 SERPL-SCNC: 25 MMOL/L
CREAT SERPL-MCNC: 0.58 MG/DL
EOSINOPHIL # BLD AUTO: 0.04 K/UL
EOSINOPHIL NFR BLD AUTO: 1.1 %
ESTIMATED AVERAGE GLUCOSE: 120 MG/DL
GLUCOSE SERPL-MCNC: 78 MG/DL
HBA1C MFR BLD HPLC: 5.8 %
HCT VFR BLD CALC: 40 %
HDLC SERPL-MCNC: 98 MG/DL
HGB BLD-MCNC: 12.6 G/DL
IMM GRANULOCYTES NFR BLD AUTO: 0.3 %
LDLC SERPL CALC-MCNC: 196 MG/DL
LYMPHOCYTES # BLD AUTO: 1.73 K/UL
LYMPHOCYTES NFR BLD AUTO: 45.6 %
MAN DIFF?: NORMAL
MCHC RBC-ENTMCNC: 28.5 PG
MCHC RBC-ENTMCNC: 31.5 GM/DL
MCV RBC AUTO: 90.5 FL
MONOCYTES # BLD AUTO: 0.37 K/UL
MONOCYTES NFR BLD AUTO: 9.8 %
NEUTROPHILS # BLD AUTO: 1.62 K/UL
NEUTROPHILS NFR BLD AUTO: 42.7 %
NONHDLC SERPL-MCNC: 208 MG/DL
PLATELET # BLD AUTO: 252 K/UL
POTASSIUM SERPL-SCNC: 3.9 MMOL/L
PROT SERPL-MCNC: 7.6 G/DL
RBC # BLD: 4.42 M/UL
RBC # FLD: 13.5 %
SODIUM SERPL-SCNC: 140 MMOL/L
TRIGL SERPL-MCNC: 63 MG/DL
TSH SERPL-ACNC: 3.65 UIU/ML
WBC # FLD AUTO: 3.79 K/UL

## 2022-04-28 ENCOUNTER — RX RENEWAL (OUTPATIENT)
Age: 67
End: 2022-04-28

## 2022-04-28 ENCOUNTER — APPOINTMENT (OUTPATIENT)
Dept: OBGYN | Facility: CLINIC | Age: 67
End: 2022-04-28

## 2022-06-24 ENCOUNTER — APPOINTMENT (OUTPATIENT)
Dept: OBGYN | Facility: CLINIC | Age: 67
End: 2022-06-24
Payer: COMMERCIAL

## 2022-06-24 VITALS
BODY MASS INDEX: 21.71 KG/M2 | HEIGHT: 62 IN | DIASTOLIC BLOOD PRESSURE: 76 MMHG | SYSTOLIC BLOOD PRESSURE: 123 MMHG | HEART RATE: 70 BPM | WEIGHT: 118 LBS

## 2022-06-24 DIAGNOSIS — Z01.419 ENCOUNTER FOR GYNECOLOGICAL EXAMINATION (GENERAL) (ROUTINE) W/OUT ABNORMAL FINDINGS: ICD-10-CM

## 2022-06-24 PROCEDURE — 99397 PER PM REEVAL EST PAT 65+ YR: CPT

## 2022-06-25 LAB — HPV HIGH+LOW RISK DNA PNL CVX: NOT DETECTED

## 2022-06-25 NOTE — CHIEF COMPLAINT
[Annual Visit] : annual visit [FreeTextEntry1] : father withdementia\par Has 2  [facility]..6 mos apart\par Works [employer] at home\frantz Colorguard for rectal bleeding--rec colonoscopy--last one 10 years ago

## 2022-06-25 NOTE — HISTORY OF PRESENT ILLNESS
[Good] : being in good health [Last Mammogram ___] : Last Mammogram was [unfilled] [Last Bone Density ___] : Last bone density studies [unfilled] [Last Colonoscopy ___] : Last colonoscopy [unfilled] [Last Pap ___] : Last cervical pap smear was [unfilled] [Postmenopausal] : is postmenopausal [HPV Vaccine NA Due to Age] : HPV vaccine not available to patient due to age [Mammogramdate] : 2021 [BreastSonogramDate] : 2021 [PapSmeardate] : 2019 [BoneDensityDate] : 2021

## 2022-06-25 NOTE — PHYSICAL EXAM
[Awake] : awake [Alert] : alert [Soft] : soft [Oriented x3] : oriented to person, place, and time [Normal] : uterus [Atrophy] : atrophy [No Bleeding] : there was no active vaginal bleeding [Uterine Adnexae] : were not tender and not enlarged [Chaperone Present] : A chaperone was present in the examining room during all aspects of the physical examination [Acute Distress] : no acute distress [Mass] : no breast mass [Nipple Discharge] : no nipple discharge [Axillary LAD] : no axillary lymphadenopathy [Tender] : non tender

## 2022-06-30 LAB — CYTOLOGY CVX/VAG DOC THIN PREP: ABNORMAL

## 2022-07-29 ENCOUNTER — APPOINTMENT (OUTPATIENT)
Dept: ENDOCRINOLOGY | Facility: CLINIC | Age: 67
End: 2022-07-29

## 2022-07-29 VITALS
BODY MASS INDEX: 21.16 KG/M2 | RESPIRATION RATE: 16 BRPM | TEMPERATURE: 97.3 F | DIASTOLIC BLOOD PRESSURE: 82 MMHG | HEIGHT: 62 IN | OXYGEN SATURATION: 97 % | HEART RATE: 67 BPM | WEIGHT: 115 LBS | SYSTOLIC BLOOD PRESSURE: 120 MMHG

## 2022-07-29 PROCEDURE — 96401 CHEMO ANTI-NEOPL SQ/IM: CPT

## 2022-07-29 RX ORDER — DENOSUMAB 60 MG/ML
60 INJECTION SUBCUTANEOUS
Qty: 1 | Refills: 0 | Status: COMPLETED | OUTPATIENT
Start: 2022-07-29

## 2022-07-29 RX ADMIN — DENOSUMAB 60 MG/ML: 60 INJECTION SUBCUTANEOUS at 00:00

## 2022-08-01 LAB
25(OH)D3 SERPL-MCNC: 54.4 NG/ML
ALBUMIN SERPL ELPH-MCNC: 4.7 G/DL
ALP BLD-CCNC: 63 U/L
ALT SERPL-CCNC: 23 U/L
ANION GAP SERPL CALC-SCNC: 12 MMOL/L
AST SERPL-CCNC: 29 U/L
BILIRUB SERPL-MCNC: 0.4 MG/DL
BUN SERPL-MCNC: 20 MG/DL
CALCIUM SERPL-MCNC: 9.6 MG/DL
CHLORIDE SERPL-SCNC: 106 MMOL/L
CO2 SERPL-SCNC: 27 MMOL/L
CREAT SERPL-MCNC: 0.65 MG/DL
EGFR: 96 ML/MIN/1.73M2
GLUCOSE SERPL-MCNC: 78 MG/DL
POTASSIUM SERPL-SCNC: 4.7 MMOL/L
PROT SERPL-MCNC: 7.5 G/DL
SODIUM SERPL-SCNC: 145 MMOL/L

## 2022-12-20 ENCOUNTER — APPOINTMENT (OUTPATIENT)
Dept: MAMMOGRAPHY | Facility: IMAGING CENTER | Age: 67
End: 2022-12-20

## 2023-02-10 ENCOUNTER — APPOINTMENT (OUTPATIENT)
Dept: ENDOCRINOLOGY | Facility: CLINIC | Age: 68
End: 2023-02-10
Payer: COMMERCIAL

## 2023-02-10 ENCOUNTER — APPOINTMENT (OUTPATIENT)
Dept: MAMMOGRAPHY | Facility: IMAGING CENTER | Age: 68
End: 2023-02-10

## 2023-02-10 VITALS
WEIGHT: 111 LBS | OXYGEN SATURATION: 98 % | HEIGHT: 63 IN | DIASTOLIC BLOOD PRESSURE: 84 MMHG | SYSTOLIC BLOOD PRESSURE: 122 MMHG | HEART RATE: 78 BPM | BODY MASS INDEX: 19.67 KG/M2

## 2023-02-10 PROCEDURE — 96401 CHEMO ANTI-NEOPL SQ/IM: CPT

## 2023-02-10 PROCEDURE — 99214 OFFICE O/P EST MOD 30 MIN: CPT | Mod: 25

## 2023-02-10 RX ORDER — DENOSUMAB 60 MG/ML
60 INJECTION SUBCUTANEOUS
Qty: 1 | Refills: 0 | Status: COMPLETED | OUTPATIENT
Start: 2023-02-10

## 2023-02-10 RX ADMIN — DENOSUMAB 60 MG/ML: 60 INJECTION SUBCUTANEOUS at 00:00

## 2023-02-11 NOTE — HISTORY OF PRESENT ILLNESS
[Alendronate (Fosomax)] : Alendronate [Risedronate (Actonel)] : Risedronate [Denosumab (Prolia)] : Denosumab [FreeTextEntry1] : Patient returns for a follow up visit for osteoporosis. Pt reports that her  recently passed away from cardiac arrest. \par \par Tried Actonel did not tolerate. Began Prolia 10/2019. Tolerating well. No thigh pain, no interval fx. Last DDS within past 6 months. No ONJ.  States that she might have been previously treated with Fosamax ~10 years ago but stopped taking due to side effects. No hx of osteoporosis related fx. No FHx of osteoporosis. Up to date with mammography. Takes Vit D supplements. \par \par H/o of foot surgery.

## 2023-02-11 NOTE — END OF VISIT
[FreeTextEntry3] : This note was written by Raeann Lacey on ( February 10, 2023 ) acting as a medical scribe for Dr. Weinberg This note was authored by the medical scribe for me. I have reviewed, edited, and revised the note as needed. I am in agreement with the exam findings, imaging findings, and treatment plan.  Lee Weinberg MD

## 2023-02-11 NOTE — ASSESSMENT
[Denosumab Therapy] : Risks  and benefits of denosumab therapy were discussed with the patient including eczema, cellulitis, osteonecrosis of the jaw and atypical femur fractures [FreeTextEntry1] : 68 y/o female with postmenopausal osteoporosis.\par \par Pt advised of high risk for future fracture. Tried Actonel did not tolerate. Began Prolia 10/2019. Tolerating well. No thigh pain, no interval fx. BMD 7/2021 indicates improved osteoporosis in spine and hip, stable but severely low stable osteoporosis in proximal radius. BMD results reviewed w/ pt. Continue Prolia, from [facility].\par \par Vitamin D insufficiency. Continue vitamin D 5,000 units per day.\par \par Request labs sent out.\par \par F/u in 6 months w BMD

## 2023-05-12 ENCOUNTER — APPOINTMENT (OUTPATIENT)
Dept: MAMMOGRAPHY | Facility: IMAGING CENTER | Age: 68
End: 2023-05-12
Payer: COMMERCIAL

## 2023-05-12 ENCOUNTER — RESULT REVIEW (OUTPATIENT)
Age: 68
End: 2023-05-12

## 2023-05-12 ENCOUNTER — OUTPATIENT (OUTPATIENT)
Dept: OUTPATIENT SERVICES | Facility: HOSPITAL | Age: 68
LOS: 1 days | End: 2023-05-12
Payer: COMMERCIAL

## 2023-05-12 ENCOUNTER — APPOINTMENT (OUTPATIENT)
Dept: ULTRASOUND IMAGING | Facility: IMAGING CENTER | Age: 68
End: 2023-05-12
Payer: COMMERCIAL

## 2023-05-12 DIAGNOSIS — R92.2 INCONCLUSIVE MAMMOGRAM: ICD-10-CM

## 2023-05-12 DIAGNOSIS — Z12.39 ENCOUNTER FOR OTHER SCREENING FOR MALIGNANT NEOPLASM OF BREAST: ICD-10-CM

## 2023-05-12 PROCEDURE — 77063 BREAST TOMOSYNTHESIS BI: CPT | Mod: 26

## 2023-05-12 PROCEDURE — 77067 SCR MAMMO BI INCL CAD: CPT | Mod: 26

## 2023-05-12 PROCEDURE — 76641 ULTRASOUND BREAST COMPLETE: CPT | Mod: 26,50

## 2023-05-12 PROCEDURE — 77063 BREAST TOMOSYNTHESIS BI: CPT

## 2023-05-12 PROCEDURE — 77067 SCR MAMMO BI INCL CAD: CPT

## 2023-05-12 PROCEDURE — 76641 ULTRASOUND BREAST COMPLETE: CPT

## 2023-06-17 ENCOUNTER — RX RENEWAL (OUTPATIENT)
Age: 68
End: 2023-06-17

## 2023-06-17 RX ORDER — DENOSUMAB 60 MG/ML
60 INJECTION SUBCUTANEOUS
Qty: 1 | Refills: 1 | Status: ACTIVE | COMMUNITY
Start: 2019-09-11 | End: 1900-01-01

## 2023-07-08 NOTE — PHYSICAL EXAM
[Chaperone Present] : A chaperone was present in the examining room during all aspects of the physical examination [Awake] : awake [Alert] : alert [Acute Distress] : no acute distress [Mass] : no breast mass [Nipple Discharge] : no nipple discharge [Axillary LAD] : no axillary lymphadenopathy [Soft] : soft [Tender] : non tender [Oriented x3] : oriented to person, place, and time [Normal] : uterus [Atrophy] : atrophy [No Bleeding] : there was no active vaginal bleeding [Uterine Adnexae] : were not tender and not enlarged

## 2023-07-08 NOTE — CHIEF COMPLAINT
[Annual Visit] : annual visit [FreeTextEntry1] : father withdementia\par Has 2  [facility]..6 mos apart\par Works [employer] at home\frantz Colorguard for rectal bleeding--rec colonoscopy--last one 10 years ago

## 2023-07-08 NOTE — HISTORY OF PRESENT ILLNESS
[Mammogramdate] : 2021 [BreastSonogramDate] : 2021 [PapSmeardate] : 2019 [BoneDensityDate] : 2021 [Good] : being in good health [Last Mammogram ___] : Last Mammogram was [unfilled] [Last Bone Density ___] : Last bone density studies [unfilled] [Last Colonoscopy ___] : Last colonoscopy [unfilled] [Last Pap ___] : Last cervical pap smear was [unfilled] [Postmenopausal] : is postmenopausal [HPV Vaccine NA Due to Age] : HPV vaccine not available to patient due to age

## 2023-07-14 ENCOUNTER — APPOINTMENT (OUTPATIENT)
Dept: OBGYN | Facility: CLINIC | Age: 68
End: 2023-07-14

## 2023-08-18 ENCOUNTER — APPOINTMENT (OUTPATIENT)
Dept: ENDOCRINOLOGY | Facility: CLINIC | Age: 68
End: 2023-08-18
Payer: COMMERCIAL

## 2023-08-18 VITALS — DIASTOLIC BLOOD PRESSURE: 78 MMHG | OXYGEN SATURATION: 96 % | HEART RATE: 68 BPM | SYSTOLIC BLOOD PRESSURE: 112 MMHG

## 2023-08-18 VITALS — WEIGHT: 110 LBS | BODY MASS INDEX: 19.99 KG/M2 | HEIGHT: 62.2 IN

## 2023-08-18 DIAGNOSIS — E55.9 VITAMIN D DEFICIENCY, UNSPECIFIED: ICD-10-CM

## 2023-08-18 PROCEDURE — ZZZZZ: CPT

## 2023-08-18 PROCEDURE — 96401 CHEMO ANTI-NEOPL SQ/IM: CPT

## 2023-08-18 PROCEDURE — 99214 OFFICE O/P EST MOD 30 MIN: CPT | Mod: 25

## 2023-08-18 PROCEDURE — 77080 DXA BONE DENSITY AXIAL: CPT

## 2023-08-18 RX ORDER — DENOSUMAB 60 MG/ML
60 INJECTION SUBCUTANEOUS
Qty: 1 | Refills: 0 | Status: COMPLETED | OUTPATIENT
Start: 2023-08-18

## 2023-08-18 RX ADMIN — DENOSUMAB 60 MG/ML: 60 INJECTION SUBCUTANEOUS at 00:00

## 2023-08-18 NOTE — PROCEDURE
[FreeTextEntry1] : Bone mineral density August 18, 2023 Compared to 2021. Spine -2.8 osteoporosis +5.3%, +9.4% versus 2019 when patient started Prolia Hip -2.9 osteoporosis no significant change versus 2021+5.1% versus 2019 Femoral neck -3.1 no significant change  Trochanter -2.0 osteopenia +8.9% versus baseline Proximal radius -4.2 osteoporosis no significant change versus 2021+5.4% versus 2019    Bone mineral density: 07/09/2021  Indication: vs. 2019 assess response to medication Spine: -3.1 osteoporosis, +3.9% Total hip: -2.8 osteoporosis, +7.8% Femoral neck: -2.9 osteoporosis, +7.8% Proximal radius: -4.5 osteoporosis, no significant change  Bone mineral density: 07/03/2019  Indication: vs 2017 prior test showed rapid bone loss  Spine: -3.4, osteoporosis, no significant change  Total hip: -3.1, osteoporosis, decrease vs 2017 Femoral neck: -3.3, osteoporosis, decrease vs 2017 Proximal radius: -4.6, osteoporosis, no prior  Bone mineral density: 2017 at an outside study vs 2011 Spine: -3.4, osteoporosis -15.9%  Total hip: -2.8, osteoporosis -18.4% Femoral neck: -2.8, osteoporosis  -18.0% Proximal radius: not performed

## 2023-08-18 NOTE — ASSESSMENT
[Denosumab Therapy] : Risks  and benefits of denosumab therapy were discussed with the patient including eczema, cellulitis, osteonecrosis of the jaw and atypical femur fractures [FreeTextEntry1] : 69 y/o female with postmenopausal osteoporosis.  Pt advised of high risk for future fracture. Tried Actonel did not tolerate. Began Prolia 10/2019. Tolerating well. No thigh pain, no interval fx. BMD 7/2021 indicates improved osteoporosis in spine and hip, stable but severely low stable osteoporosis in proximal radius.  Bone mineral density 2023 significantly improved. BMD results reviewed w/ pt. Continue Prolia, from [facility]. Review of blood test.  Calcium 9.5, creatinine 0.73, TSH 3.2, vitamin D 84.4.  Hemoglobin A1c mildly elevated at 5.7%. Vitamin D insufficiency.  Vit D 84 Can decrease to 2,,000 units per day.   F/u in 6 months

## 2023-08-18 NOTE — HISTORY OF PRESENT ILLNESS
[Alendronate (Fosomax)] : Alendronate [Risedronate (Actonel)] : Risedronate [Denosumab (Prolia)] : Denosumab [FreeTextEntry1] : Patient returns for a follow up visit for osteoporosis.  Tried Actonel did not tolerate. Began Prolia 10/2019. Tolerating well. No thigh pain, no interval fx. Last DDS within past 6 months. No ONJ.  States that she might have been previously treated with Fosamax ~10 years ago but stopped taking due to side effects. No hx of osteoporosis related fx. No FHx of osteoporosis. Up to date with mammography. Takes Vit D supplements.   H/o of foot surgery.

## 2023-08-18 NOTE — PHYSICAL EXAM
[Well Nourished] : well nourished [Alert] : alert [No Acute Distress] : no acute distress [Well Developed] : well developed [Normal Sclera/Conjunctiva] : normal sclera/conjunctiva [EOMI] : extra ocular movement intact [No Proptosis] : no proptosis [Thyroid Not Enlarged] : the thyroid was not enlarged [No Thyroid Nodules] : no palpable thyroid nodules [Clear to Auscultation] : lungs were clear to auscultation bilaterally [Normal S1, S2] : normal S1 and S2 [Normal Rate] : heart rate was normal [Regular Rhythm] : with a regular rhythm [No Edema] : no peripheral edema [Not Tender] : non-tender [Normal Bowel Sounds] : normal bowel sounds [Not Distended] : not distended [Soft] : abdomen soft [Normal Anterior Cervical Nodes] : no anterior cervical lymphadenopathy [No Spinal Tenderness] : no spinal tenderness [Spine Straight] : spine straight [No Stigmata of Cushings Syndrome] : no stigmata of Cushings Syndrome [Normal Gait] : normal gait [Normal Reflexes] : deep tendon reflexes were 2+ and symmetric [No Tremors] : no tremors [Oriented x3] : oriented to person, place, and time

## 2023-12-26 ENCOUNTER — APPOINTMENT (OUTPATIENT)
Age: 68
End: 2023-12-26

## 2023-12-29 ENCOUNTER — APPOINTMENT (OUTPATIENT)
Age: 68
End: 2023-12-29
Payer: COMMERCIAL

## 2023-12-29 PROCEDURE — D0230: CPT

## 2023-12-29 PROCEDURE — D0220: CPT

## 2023-12-29 PROCEDURE — D0120: CPT

## 2023-12-29 PROCEDURE — D0274: CPT

## 2023-12-29 PROCEDURE — D1110 PROPHYLAXIS - ADULT: CPT

## 2024-02-23 ENCOUNTER — APPOINTMENT (OUTPATIENT)
Dept: ENDOCRINOLOGY | Facility: CLINIC | Age: 69
End: 2024-02-23
Payer: COMMERCIAL

## 2024-02-23 DIAGNOSIS — M81.0 AGE-RELATED OSTEOPOROSIS W/OUT CURRENT PATHOLOGICAL FRACTURE: ICD-10-CM

## 2024-02-23 PROCEDURE — 96401 CHEMO ANTI-NEOPL SQ/IM: CPT

## 2024-02-23 RX ORDER — DENOSUMAB 60 MG/ML
60 INJECTION SUBCUTANEOUS
Qty: 1 | Refills: 0 | Status: COMPLETED | OUTPATIENT
Start: 2024-02-20

## 2024-03-29 ENCOUNTER — APPOINTMENT (OUTPATIENT)
Age: 69
End: 2024-03-29
Payer: COMMERCIAL

## 2024-03-29 PROCEDURE — D1110 PROPHYLAXIS - ADULT: CPT

## 2024-08-02 ENCOUNTER — RX RENEWAL (OUTPATIENT)
Age: 69
End: 2024-08-02

## 2024-08-30 ENCOUNTER — APPOINTMENT (OUTPATIENT)
Age: 69
End: 2024-08-30

## 2024-08-30 ENCOUNTER — RESULT REVIEW (OUTPATIENT)
Age: 69
End: 2024-08-30

## 2024-08-30 ENCOUNTER — OUTPATIENT (OUTPATIENT)
Dept: OUTPATIENT SERVICES | Facility: HOSPITAL | Age: 69
LOS: 1 days | End: 2024-08-30
Payer: COMMERCIAL

## 2024-08-30 ENCOUNTER — APPOINTMENT (OUTPATIENT)
Dept: ULTRASOUND IMAGING | Facility: IMAGING CENTER | Age: 69
End: 2024-08-30
Payer: COMMERCIAL

## 2024-08-30 ENCOUNTER — APPOINTMENT (OUTPATIENT)
Dept: MAMMOGRAPHY | Facility: IMAGING CENTER | Age: 69
End: 2024-08-30
Payer: COMMERCIAL

## 2024-08-30 ENCOUNTER — APPOINTMENT (OUTPATIENT)
Dept: ENDOCRINOLOGY | Facility: CLINIC | Age: 69
End: 2024-08-30
Payer: COMMERCIAL

## 2024-08-30 VITALS
BODY MASS INDEX: 20.9 KG/M2 | DIASTOLIC BLOOD PRESSURE: 68 MMHG | WEIGHT: 115 LBS | HEART RATE: 64 BPM | SYSTOLIC BLOOD PRESSURE: 116 MMHG | OXYGEN SATURATION: 96 %

## 2024-08-30 DIAGNOSIS — M81.0 AGE-RELATED OSTEOPOROSIS W/OUT CURRENT PATHOLOGICAL FRACTURE: ICD-10-CM

## 2024-08-30 DIAGNOSIS — Z12.39 ENCOUNTER FOR OTHER SCREENING FOR MALIGNANT NEOPLASM OF BREAST: ICD-10-CM

## 2024-08-30 PROCEDURE — 77067 SCR MAMMO BI INCL CAD: CPT

## 2024-08-30 PROCEDURE — 96401 CHEMO ANTI-NEOPL SQ/IM: CPT

## 2024-08-30 PROCEDURE — 77067 SCR MAMMO BI INCL CAD: CPT | Mod: 26

## 2024-08-30 PROCEDURE — 77063 BREAST TOMOSYNTHESIS BI: CPT

## 2024-08-30 PROCEDURE — 76641 ULTRASOUND BREAST COMPLETE: CPT | Mod: 26,50

## 2024-08-30 PROCEDURE — 99213 OFFICE O/P EST LOW 20 MIN: CPT | Mod: 25

## 2024-08-30 PROCEDURE — 77063 BREAST TOMOSYNTHESIS BI: CPT | Mod: 26

## 2024-08-30 PROCEDURE — 76641 ULTRASOUND BREAST COMPLETE: CPT

## 2024-08-30 RX ORDER — ROSUVASTATIN CALCIUM 5 MG/1
TABLET, FILM COATED ORAL
Refills: 0 | Status: ACTIVE | COMMUNITY

## 2024-08-30 RX ORDER — DENOSUMAB 60 MG/ML
60 INJECTION SUBCUTANEOUS
Qty: 1 | Refills: 0 | Status: COMPLETED | OUTPATIENT
Start: 2024-08-30

## 2024-08-30 RX ADMIN — DENOSUMAB 60 MG/ML: 60 INJECTION SUBCUTANEOUS at 00:00

## 2024-08-30 NOTE — HISTORY OF PRESENT ILLNESS
[FreeTextEntry1] : Patient returns for a follow up visit for osteoporosis.  Tried Actonel did not tolerate. Began Prolia 10/2019. Tolerating well. No thigh pain, no interval fx. Last DDS within past 6 months. No ONJ.  States that she might have been previously treated with Fosamax ~10 years ago but stopped taking due to side effects. No hx of osteoporosis related fx. No FHx of osteoporosis. Up to date with mammography. Takes Vit D supplements.   H/o of foot surgery.

## 2024-08-30 NOTE — ASSESSMENT
[Denosumab Therapy] : Risks  and benefits of denosumab therapy were discussed with the patient including eczema, cellulitis, osteonecrosis of the jaw and atypical femur fractures [FreeTextEntry1] : 69 y/o female with postmenopausal osteoporosis.  Pt advised of high risk for future fracture. Tried Actonel did not tolerate. Began Prolia 10/2019. Tolerating well. No thigh pain, no interval fx.   Bone mineral density 2023 significantly improved. BMD results reviewed w/ pt. Continue Prolia, from [facility].   Vitamin D insufficiency.   on 2,,000 units per day.  Request blood test from Dr. Vasiliy Kaufman F/u in 6 months   Repeat bone density summer 2025

## 2024-09-27 ENCOUNTER — APPOINTMENT (OUTPATIENT)
Age: 69
End: 2024-09-27

## 2024-09-27 PROCEDURE — D1110 PROPHYLAXIS - ADULT: CPT

## 2024-09-27 PROCEDURE — D0120: CPT

## 2024-12-19 ENCOUNTER — NON-APPOINTMENT (OUTPATIENT)
Age: 69
End: 2024-12-19

## 2024-12-20 ENCOUNTER — APPOINTMENT (OUTPATIENT)
Dept: OBGYN | Facility: CLINIC | Age: 69
End: 2024-12-20
Payer: COMMERCIAL

## 2024-12-20 VITALS
SYSTOLIC BLOOD PRESSURE: 119 MMHG | HEIGHT: 62.2 IN | DIASTOLIC BLOOD PRESSURE: 82 MMHG | WEIGHT: 116 LBS | HEART RATE: 63 BPM | BODY MASS INDEX: 21.08 KG/M2

## 2024-12-20 DIAGNOSIS — Z63.4 DISAPPEARANCE AND DEATH OF FAMILY MEMBER: ICD-10-CM

## 2024-12-20 DIAGNOSIS — Z01.419 ENCOUNTER FOR GYNECOLOGICAL EXAMINATION (GENERAL) (ROUTINE) W/OUT ABNORMAL FINDINGS: ICD-10-CM

## 2024-12-20 PROCEDURE — 99397 PER PM REEVAL EST PAT 65+ YR: CPT

## 2024-12-20 PROCEDURE — 99459 PELVIC EXAMINATION: CPT

## 2024-12-20 SDOH — SOCIAL STABILITY - SOCIAL INSECURITY: DISSAPEARANCE AND DEATH OF FAMILY MEMBER: Z63.4

## 2025-01-31 ENCOUNTER — APPOINTMENT (OUTPATIENT)
Age: 70
End: 2025-01-31

## 2025-03-28 ENCOUNTER — APPOINTMENT (OUTPATIENT)
Dept: ENDOCRINOLOGY | Facility: CLINIC | Age: 70
End: 2025-03-28
Payer: COMMERCIAL

## 2025-03-28 DIAGNOSIS — M81.0 AGE-RELATED OSTEOPOROSIS W/OUT CURRENT PATHOLOGICAL FRACTURE: ICD-10-CM

## 2025-03-28 PROCEDURE — 96401 CHEMO ANTI-NEOPL SQ/IM: CPT

## 2025-03-28 RX ORDER — DENOSUMAB 60 MG/ML
60 INJECTION SUBCUTANEOUS
Qty: 1 | Refills: 0 | Status: COMPLETED | OUTPATIENT
Start: 2025-03-21

## 2025-08-02 ENCOUNTER — RX RENEWAL (OUTPATIENT)
Age: 70
End: 2025-08-02